# Patient Record
Sex: MALE | Race: OTHER | RURAL
[De-identification: names, ages, dates, MRNs, and addresses within clinical notes are randomized per-mention and may not be internally consistent; named-entity substitution may affect disease eponyms.]

---

## 2022-05-02 ENCOUNTER — CONSULTATION/EVALUATION (OUTPATIENT)
Dept: RURAL CLINIC 3 | Facility: CLINIC | Age: 77
End: 2022-05-02

## 2022-05-02 DIAGNOSIS — H25.13: ICD-10-CM

## 2022-05-02 PROCEDURE — 99204 OFFICE O/P NEW MOD 45 MIN: CPT

## 2022-05-02 ASSESSMENT — VISUAL ACUITY
OS_BAT: 20/70
OD_SC: 20/50
OD_BAT: 20/100
OS_CC: 20/20
OD_CC: 20/40
OS_CC: 20/25
OS_SC: 20/40-1
OD_CC: 20/40
OD_PAM: 20/20-2
OS_AM: 20/20

## 2022-05-02 ASSESSMENT — TONOMETRY
OD_IOP_MMHG: 16
OS_IOP_MMHG: 18

## 2022-05-02 NOTE — PATIENT DISCUSSION
(Surgical) Visually Significant (secondary to glare), discussed the risks, benefits, alternatives, and limitations of cataract surgery. The patient stated a full understanding and a desire to proceed with the procedure. The patient will need to return for pre-op appointment with cataract measurements and to have any additional questions answered, and start pre-operative eye drops as directed.  Pt elects to proceed with Stand/Trad OU starting with OD first and then OS after. Discussed need for bifocals s/p. Dextenza OU +. Discussed dense nature of cataracts and advised to use Lynsey 128 gtts & enrique x 2 weeks s/p & discussed [facility] expectations s/p with post op expectations.

## 2022-06-03 ENCOUNTER — PRE-OP/H&P (OUTPATIENT)
Dept: RURAL CLINIC 3 | Facility: CLINIC | Age: 77
End: 2022-06-03

## 2022-06-03 VITALS
WEIGHT: 202.4 LBS | HEIGHT: 70 IN | DIASTOLIC BLOOD PRESSURE: 78 MMHG | BODY MASS INDEX: 28.98 KG/M2 | SYSTOLIC BLOOD PRESSURE: 138 MMHG | HEART RATE: 75 BPM

## 2022-06-03 DIAGNOSIS — H25.13: ICD-10-CM

## 2022-06-03 DIAGNOSIS — Z01.818: ICD-10-CM

## 2022-06-03 PROCEDURE — 99499 UNLISTED E&M SERVICE: CPT

## 2022-06-03 ASSESSMENT — VISUAL ACUITY
OS_AM: 20/20
OD_PAM: 20/20-2
OS_CC: 20/20
OD_SC: 20/50
OS_SC: 20/40-1
OD_CC: 20/40
OD_CC: 20/40
OS_BAT: 20/100
OD_BAT: 20/100
OS_CC: 20/25

## 2022-06-16 ENCOUNTER — POST OP/EVAL OF SECOND EYE (OUTPATIENT)
Dept: RURAL CLINIC 3 | Facility: CLINIC | Age: 77
End: 2022-06-16

## 2022-06-16 ENCOUNTER — SURGERY/PROCEDURE (OUTPATIENT)
Dept: RURAL CLINIC 4 | Facility: CLINIC | Age: 77
End: 2022-06-16

## 2022-06-16 VITALS
DIASTOLIC BLOOD PRESSURE: 80 MMHG | WEIGHT: 204 LBS | BODY MASS INDEX: 29.2 KG/M2 | HEART RATE: 61 BPM | HEIGHT: 70 IN | SYSTOLIC BLOOD PRESSURE: 149 MMHG

## 2022-06-16 DIAGNOSIS — Z98.41: ICD-10-CM

## 2022-06-16 DIAGNOSIS — H25.11: ICD-10-CM

## 2022-06-16 PROCEDURE — 68841 INSJ RX ELUT IMPLT LAC CANAL: CPT

## 2022-06-16 PROCEDURE — 99024 POSTOP FOLLOW-UP VISIT: CPT

## 2022-06-16 PROCEDURE — 66982 XCAPSL CTRC RMVL CPLX WO ECP: CPT

## 2022-06-16 ASSESSMENT — TONOMETRY: OS_IOP_MMHG: 20

## 2022-06-16 ASSESSMENT — VISUAL ACUITY
OD_PH: 20/40
OS_CC: 20/25
OD_SC: 20/150
OS_AM: 20/20
OS_SC: 20/40-1
OS_BAT: 20/100
OS_CC: 20/20

## 2022-06-16 NOTE — PATIENT DISCUSSION
(Surgical) Visually Significant (secondary to glare), discussed the risks, benefits, alternatives, and limitations of cataract surgery. The patient stated a full understanding and a desire to proceed with the procedure. The patient will need to return for pre-op appointment with cataract measurements and to have any additional questions answered, and start pre-operative eye drops as directed. A/S for surgery.

## 2022-06-16 NOTE — PATIENT DISCUSSION
Pt is doing well with lens in place and stable. Continue post op gtts and follow restrictions as directed.

## 2022-06-16 NOTE — PATIENT DISCUSSION
Medical Clearance done today. No outstanding medical concerns that would preclude surgery. Patient is medically cleared to proceed with surgery.

## 2022-06-21 ENCOUNTER — POST-OP (OUTPATIENT)
Dept: RURAL CLINIC 3 | Facility: CLINIC | Age: 77
End: 2022-06-21

## 2022-06-21 DIAGNOSIS — Z98.41: ICD-10-CM

## 2022-06-21 PROCEDURE — 99024 POSTOP FOLLOW-UP VISIT: CPT

## 2022-06-21 ASSESSMENT — TONOMETRY
OD_IOP_MMHG: 19
OS_IOP_MMHG: 18

## 2022-06-21 ASSESSMENT — VISUAL ACUITY
OS_PH: 20/30-1
OS_SC: 20/90

## 2022-06-21 NOTE — PATIENT DISCUSSION
Patient doing well and stable. IOP improved to 19 today. Continue post op drops as directed. Continue to monitor.

## 2022-07-05 ENCOUNTER — POST-OP (OUTPATIENT)
Dept: RURAL CLINIC 3 | Facility: CLINIC | Age: 77
End: 2022-07-05

## 2022-07-05 DIAGNOSIS — Z98.41: ICD-10-CM

## 2022-07-05 PROCEDURE — 99024 POSTOP FOLLOW-UP VISIT: CPT

## 2022-07-05 ASSESSMENT — VISUAL ACUITY
OD_SC: 20/40-1
OS_SC: 20/60-2
OS_PH: 20/30

## 2022-07-05 ASSESSMENT — TONOMETRY
OS_IOP_MMHG: 18
OD_IOP_MMHG: 18

## 2022-07-05 NOTE — PATIENT DISCUSSION
Patient doing well and stable. IOP at 18 today. Continue post op drops as directed. Continue to monitor.

## 2022-07-21 ENCOUNTER — SURGERY/PROCEDURE (OUTPATIENT)
Dept: RURAL CLINIC 4 | Facility: CLINIC | Age: 77
End: 2022-07-21

## 2022-07-21 ENCOUNTER — POST-OP (OUTPATIENT)
Dept: RURAL CLINIC 3 | Facility: CLINIC | Age: 77
End: 2022-07-21

## 2022-07-21 DIAGNOSIS — Z98.42: ICD-10-CM

## 2022-07-21 DIAGNOSIS — Z98.41: ICD-10-CM

## 2022-07-21 DIAGNOSIS — H25.12: ICD-10-CM

## 2022-07-21 PROCEDURE — 66982 XCAPSL CTRC RMVL CPLX WO ECP: CPT

## 2022-07-21 PROCEDURE — 68841 INSJ RX ELUT IMPLT LAC CANAL: CPT

## 2022-07-21 PROCEDURE — 99024 POSTOP FOLLOW-UP VISIT: CPT

## 2022-07-21 ASSESSMENT — VISUAL ACUITY
OD_SC: 20/50
OS_PH: 20/40
OS_SC: 20/50-1
OD_PH: 20/25

## 2022-07-21 ASSESSMENT — TONOMETRY
OS_IOP_MMHG: 20
OD_IOP_MMHG: 14